# Patient Record
Sex: FEMALE | Race: WHITE | NOT HISPANIC OR LATINO | URBAN - METROPOLITAN AREA
[De-identification: names, ages, dates, MRNs, and addresses within clinical notes are randomized per-mention and may not be internally consistent; named-entity substitution may affect disease eponyms.]

---

## 2023-03-22 ENCOUNTER — OFFICE (OUTPATIENT)
Dept: URBAN - METROPOLITAN AREA CLINIC 19 | Facility: CLINIC | Age: 23
End: 2023-03-22

## 2023-03-22 VITALS
WEIGHT: 188 LBS | HEIGHT: 64 IN | OXYGEN SATURATION: 97 % | HEART RATE: 83 BPM | DIASTOLIC BLOOD PRESSURE: 71 MMHG | SYSTOLIC BLOOD PRESSURE: 132 MMHG

## 2023-03-22 DIAGNOSIS — R10.30 LOWER ABDOMINAL PAIN, UNSPECIFIED: ICD-10-CM

## 2023-03-22 DIAGNOSIS — R19.4 CHANGE IN BOWEL HABIT: ICD-10-CM

## 2023-03-22 DIAGNOSIS — K58.9 IRRITABLE BOWEL SYNDROME WITHOUT DIARRHEA: ICD-10-CM

## 2023-03-22 DIAGNOSIS — K21.9 GASTRO-ESOPHAGEAL REFLUX DISEASE WITHOUT ESOPHAGITIS: ICD-10-CM

## 2023-03-22 PROCEDURE — 99204 OFFICE O/P NEW MOD 45 MIN: CPT

## 2023-03-22 NOTE — SERVICEHPINOTES
23-year-old single white female referred by her PCP for symptoms of IBS, including intermittent lower abdominal pain, changes in bowel habits as well as chronic GERD.  She has had these symptoms for several years, but apparently had a bad "flare" and ended up going to the ED 3/1/23 at [facility].  Routine blood work was negative and the ED doctor recommended she follow up with the GI group for further evaluation.  No imaging was done at that time.  Her bowel movements do often fluctuate between constipation and looser stool.  She typically has more constipation looser stool.  She was given a prescription by her PCP of dicyclomine 20 mg as needed which has seemed to help with her abdominal pain.  She has taken over-the-counter laxatives in the past for constipation which has been helpful.  The pantoprazole 40 mg/d does seem to help with her reflux.  She denies dysphagia, nausea, vomiting or overt GI bleeding.  She denies any previous GI tests or studies.  Family history is negative for IBD and colon neoplasm.

## 2023-03-22 NOTE — SERVICENOTES
The patient's assessment was reviewed with Dr. Santos and a collaborative plan of care was established.

## 2023-03-24 LAB
CELIAC DISEASE COMPREHENSIVE: DEAMIDATED GLIADIN ABS, IGA: 6 UNITS (ref 0–19)
CELIAC DISEASE COMPREHENSIVE: DEAMIDATED GLIADIN ABS, IGG: 8 UNITS (ref 0–19)
CELIAC DISEASE COMPREHENSIVE: ENDOMYSIAL ANTIBODY IGA: NEGATIVE
CELIAC DISEASE COMPREHENSIVE: IMMUNOGLOBULIN A, QN, SERUM: 177 MG/DL (ref 87–352)
CELIAC DISEASE COMPREHENSIVE: T-TRANSGLUTAMINASE (TTG) IGA: <2 U/ML
CELIAC DISEASE COMPREHENSIVE: T-TRANSGLUTAMINASE (TTG) IGG: 4 U/ML (ref 0–5)
HCG,BETA SUBUNIT, QNT: HCG,BETA SUBUNIT,QNT,SERUM: <1 MIU/ML
SEDIMENTATION RATE-WESTERGREN: 11 MM/HR (ref 0–32)
TSH: 1.52 UIU/ML (ref 0.45–4.5)

## 2023-06-23 ENCOUNTER — OFFICE (OUTPATIENT)
Dept: URBAN - METROPOLITAN AREA CLINIC 19 | Facility: CLINIC | Age: 23
End: 2023-06-23

## 2023-06-23 VITALS
DIASTOLIC BLOOD PRESSURE: 93 MMHG | WEIGHT: 182 LBS | SYSTOLIC BLOOD PRESSURE: 146 MMHG | HEIGHT: 64 IN | OXYGEN SATURATION: 100 % | HEART RATE: 90 BPM

## 2023-06-23 DIAGNOSIS — K58.9 IRRITABLE BOWEL SYNDROME WITHOUT DIARRHEA: ICD-10-CM

## 2023-06-23 DIAGNOSIS — K21.9 GASTRO-ESOPHAGEAL REFLUX DISEASE WITHOUT ESOPHAGITIS: ICD-10-CM

## 2023-06-23 DIAGNOSIS — R14.0 ABDOMINAL DISTENSION (GASEOUS): ICD-10-CM

## 2023-06-23 PROCEDURE — 99214 OFFICE O/P EST MOD 30 MIN: CPT

## 2023-06-23 RX ORDER — AMITRIPTYLINE HYDROCHLORIDE 25 MG/1
25 TABLET, FILM COATED ORAL
Qty: 30 | Refills: 5 | Status: COMPLETED
Start: 2023-03-31 | End: 2023-06-23

## 2023-06-23 RX ORDER — LUBIPROSTONE 8 UG/1
16 CAPSULE, GELATIN COATED ORAL
Qty: 60 | Refills: 2 | Status: COMPLETED
Start: 2023-06-23 | End: 2023-06-28

## 2023-06-23 NOTE — SERVICEHPINOTES
23-year-old single white female returns for follow-up of her IBS.  
br
carlos She was initially referred by her /23 for symptoms of IBS, including intermittent lower abdominal pain, changes in bowel habits as well as chronic GERD.  Routine lab work was unremarkable and CT abdomen / pelvis 3/31/23 was negative.  I gave her prescription for amitriptyline 25 milligrams to take at bedtime and encouraged her to use MiraLax regularly.  She had been given a prescription for dicyclomine 20 milligrams as needed for abdominal pain as well as pantoprazole 40 milligrams daily for her reflux by her PCP.
br
brToday in follow-up, she denies any significant changes in her symptoms.  She continues to go for 5 days without a bowel movement.  Her abdominal cramping is persistent and she takes Bentyl for this most days.  She has not noticed any change in her symptoms with the amitriptyline.  Her reflux is well managed on pantoprazole 40 milligrams daily.  She denies dysphagia, nausea, vomiting or overt GI bleeding.
carlos
brFamily history is negative for IBD and colon neoplasm.